# Patient Record
Sex: FEMALE | Race: AMERICAN INDIAN OR ALASKA NATIVE | Employment: PART TIME | URBAN - METROPOLITAN AREA
[De-identification: names, ages, dates, MRNs, and addresses within clinical notes are randomized per-mention and may not be internally consistent; named-entity substitution may affect disease eponyms.]

---

## 2017-01-05 ENCOUNTER — OFFICE VISIT (OUTPATIENT)
Dept: OTOLARYNGOLOGY | Facility: CLINIC | Age: 72
End: 2017-01-05

## 2017-01-05 VITALS
BODY MASS INDEX: 29.44 KG/M2 | HEIGHT: 62 IN | DIASTOLIC BLOOD PRESSURE: 78 MMHG | WEIGHT: 160 LBS | TEMPERATURE: 98 F | SYSTOLIC BLOOD PRESSURE: 126 MMHG

## 2017-01-05 DIAGNOSIS — C32.9 LARYNGEAL CARCINOMA (HCC): Primary | ICD-10-CM

## 2017-01-05 PROCEDURE — 99213 OFFICE O/P EST LOW 20 MIN: CPT | Performed by: OTOLARYNGOLOGY

## 2017-01-05 PROCEDURE — 31575 DIAGNOSTIC LARYNGOSCOPY: CPT | Performed by: OTOLARYNGOLOGY

## 2017-01-05 RX ORDER — ACETAMINOPHEN AND CODEINE PHOSPHATE 300; 30 MG/1; MG/1
TABLET ORAL
COMMUNITY
Start: 2016-11-13 | End: 2020-06-08 | Stop reason: ALTCHOICE

## 2017-01-06 NOTE — PROGRESS NOTES
James Flaherty is a 70year old female. Patient presents with: Follow - Up: regarding laryngeal carcinoma, pt is doing well     HPI:   He has occasional irritation in her throat but otherwise is dog very well.      Current Outpatient Prescriptions:  Atorv Inspection - Normal. Palpation - Normal. Parotid gland - Normal. Thyroid gland - Normal.   Psychiatric Normal Orientation - Oriented to time, place, person & situation. Appropriate mood and affect. Lymph Detail Normal Submental. Submandibular.  Anterior c

## 2017-05-15 ENCOUNTER — LAB ENCOUNTER (OUTPATIENT)
Dept: LAB | Age: 72
End: 2017-05-15
Attending: PHYSICAL MEDICINE & REHABILITATION
Payer: MEDICARE

## 2017-05-15 DIAGNOSIS — Z51.81 ENCOUNTER FOR THERAPEUTIC DRUG MONITORING: ICD-10-CM

## 2017-05-15 DIAGNOSIS — Z79.1 ENCOUNTER FOR LONG-TERM (CURRENT) USE OF NON-STEROIDAL ANTI-INFLAMMATORIES: Primary | ICD-10-CM

## 2017-05-15 PROCEDURE — 36415 COLL VENOUS BLD VENIPUNCTURE: CPT

## 2017-05-15 PROCEDURE — 85610 PROTHROMBIN TIME: CPT

## 2017-05-15 PROCEDURE — 85730 THROMBOPLASTIN TIME PARTIAL: CPT

## 2017-08-10 ENCOUNTER — OFFICE VISIT (OUTPATIENT)
Dept: OTOLARYNGOLOGY | Facility: CLINIC | Age: 72
End: 2017-08-10

## 2017-08-10 VITALS
WEIGHT: 150 LBS | SYSTOLIC BLOOD PRESSURE: 112 MMHG | DIASTOLIC BLOOD PRESSURE: 60 MMHG | TEMPERATURE: 98 F | BODY MASS INDEX: 27.6 KG/M2 | HEIGHT: 62 IN

## 2017-08-10 DIAGNOSIS — C32.9 LARYNGEAL CARCINOMA (HCC): Primary | ICD-10-CM

## 2017-08-10 PROCEDURE — 99213 OFFICE O/P EST LOW 20 MIN: CPT | Performed by: OTOLARYNGOLOGY

## 2017-08-10 PROCEDURE — 31575 DIAGNOSTIC LARYNGOSCOPY: CPT | Performed by: OTOLARYNGOLOGY

## 2017-08-10 RX ORDER — CHLORHEXIDINE GLUCONATE 0.12 MG/ML
RINSE ORAL
COMMUNITY
Start: 2017-07-12 | End: 2020-06-08 | Stop reason: ALTCHOICE

## 2017-08-10 RX ORDER — NORTRIPTYLINE HYDROCHLORIDE 10 MG/1
20 CAPSULE ORAL NIGHTLY
COMMUNITY
Start: 2017-08-07 | End: 2020-11-19 | Stop reason: DRUGHIGH

## 2017-08-11 NOTE — PROGRESS NOTES
Michael Lentz is a 70year old female. Patient presents with: Follow - Up: 7 month follow up- laryngeal carcinoma- per  pt she is doing okay    HPI:   She is doing well in follow up of her laryngeal carcinoma. Finished XRT 10/14. Feels pretty well.  No c cords and larynx    Nasal Normal External nose - Normal. Nasal septum - Normal, Turbinates - Normal   Neurological Normal Memory - Normal. Cranial nerves - Cranial nerves II through XII grossly intact.    Neck Exam Normal Inspection - Normal. Palpation - No understanding of these issues and agrees to the plan. Margarito Hunt MD  8/10/2017  10:18 PM

## 2017-12-04 ENCOUNTER — HOSPITAL ENCOUNTER (OUTPATIENT)
Dept: MAMMOGRAPHY | Age: 72
Discharge: HOME | End: 2017-12-04
Attending: INTERNAL MEDICINE
Payer: MEDICARE

## 2017-12-04 DIAGNOSIS — Z12.39 ENCOUNTER FOR SCREENING FOR MALIGNANT NEOPLASM OF BREAST: ICD-10-CM

## 2017-12-04 PROCEDURE — 77067 SCR MAMMO BI INCL CAD: CPT | Performed by: INTERNAL MEDICINE

## 2018-03-09 ENCOUNTER — HOSPITAL ENCOUNTER (OUTPATIENT)
Dept: CT IMAGING | Facility: HOSPITAL | Age: 73
Discharge: HOME | End: 2018-03-09
Attending: INTERNAL MEDICINE
Payer: MEDICARE

## 2018-03-09 DIAGNOSIS — F07.81 POSTCONCUSSION SYNDROME: ICD-10-CM

## 2018-10-02 ENCOUNTER — OFFICE VISIT (OUTPATIENT)
Dept: OTOLARYNGOLOGY | Facility: CLINIC | Age: 73
End: 2018-10-02
Payer: MEDICARE

## 2018-10-02 VITALS
DIASTOLIC BLOOD PRESSURE: 74 MMHG | HEIGHT: 62 IN | TEMPERATURE: 98 F | WEIGHT: 150 LBS | BODY MASS INDEX: 27.6 KG/M2 | SYSTOLIC BLOOD PRESSURE: 120 MMHG

## 2018-10-02 DIAGNOSIS — C32.9 LARYNGEAL CARCINOMA (HCC): Primary | ICD-10-CM

## 2018-10-02 PROCEDURE — 31575 DIAGNOSTIC LARYNGOSCOPY: CPT | Performed by: OTOLARYNGOLOGY

## 2018-10-02 PROCEDURE — 99213 OFFICE O/P EST LOW 20 MIN: CPT | Performed by: OTOLARYNGOLOGY

## 2018-10-02 NOTE — PROGRESS NOTES
Merrell Goodpasture is a 67year old female. Patient presents with:  Throat Problem: Laryngeal carcinoma: pt reports feeling well    HPI:   She has been feeling really well following her radiation therapy which she completed in 2014.   She feels that she has bu Normal. Skull - Normal.   Oral/Oropharynx Normal Lips - Normal, Tonsils - Normal, Tongue - Normal    fiberoptic laryngoscopy shows no lesions in the hypopharynx or larynx   Nasal Normal External nose - Normal. Nasal septum - Normal, Turbinates - Normal   N recurrence of her disease. She finished her therapy in 2014. I recommended another office visit in 6 months.   We can consider yearly surveillance after that  - Horace Betts      The patient indicates understanding of these issues an

## 2018-12-29 ENCOUNTER — OFFICE VISIT (OUTPATIENT)
Dept: OTOLARYNGOLOGY | Facility: CLINIC | Age: 73
End: 2018-12-29
Payer: MEDICARE

## 2018-12-29 VITALS
SYSTOLIC BLOOD PRESSURE: 110 MMHG | BODY MASS INDEX: 27.6 KG/M2 | DIASTOLIC BLOOD PRESSURE: 70 MMHG | HEIGHT: 62 IN | WEIGHT: 150 LBS | TEMPERATURE: 98 F

## 2018-12-29 DIAGNOSIS — H61.23 BILATERAL IMPACTED CERUMEN: Primary | ICD-10-CM

## 2018-12-29 PROCEDURE — 69210 REMOVE IMPACTED EAR WAX UNI: CPT | Performed by: OTOLARYNGOLOGY

## 2018-12-29 RX ORDER — AZITHROMYCIN 500 MG/1
TABLET, FILM COATED ORAL
COMMUNITY
Start: 2018-12-11 | End: 2020-06-08 | Stop reason: ALTCHOICE

## 2018-12-29 NOTE — PROGRESS NOTES
Pavan An is a 68year old female.  Patient presents with:  Ear Problem: Ear cleaning    HPI:   She feels that her ears have both been blocked and that her hearing is very diminished for the last 1-2 weeks    Current Outpatient Medications:  Nortripty - Normal   Neurological Normal Memory - Normal. Cranial nerves - Cranial nerves II through XII grossly intact.    Neck Exam Normal Inspection - Normal. Palpation - Normal. Parotid gland - Normal. Thyroid gland - Normal.   Psychiatric Normal Orientation - Or

## 2019-02-27 ENCOUNTER — HOSPITAL ENCOUNTER (OUTPATIENT)
Dept: MAMMOGRAPHY | Facility: HOSPITAL | Age: 74
Discharge: HOME | End: 2019-02-27
Attending: INTERNAL MEDICINE
Payer: MEDICARE

## 2019-02-27 DIAGNOSIS — Z12.39 BREAST SCREENING: ICD-10-CM

## 2019-02-27 PROCEDURE — 77063 BREAST TOMOSYNTHESIS BI: CPT | Performed by: INTERNAL MEDICINE

## 2019-02-27 PROCEDURE — 77067 SCR MAMMO BI INCL CAD: CPT | Performed by: INTERNAL MEDICINE

## 2019-03-19 ENCOUNTER — OFFICE VISIT (OUTPATIENT)
Dept: OTOLARYNGOLOGY | Facility: CLINIC | Age: 74
End: 2019-03-19
Payer: MEDICARE

## 2019-03-19 VITALS — BODY MASS INDEX: 27.6 KG/M2 | WEIGHT: 150 LBS | HEIGHT: 62 IN

## 2019-03-19 DIAGNOSIS — C32.9 LARYNGEAL CARCINOMA (HCC): ICD-10-CM

## 2019-03-19 DIAGNOSIS — H61.23 BILATERAL IMPACTED CERUMEN: Primary | ICD-10-CM

## 2019-03-19 PROCEDURE — 99213 OFFICE O/P EST LOW 20 MIN: CPT | Performed by: OTOLARYNGOLOGY

## 2019-03-19 PROCEDURE — 31575 DIAGNOSTIC LARYNGOSCOPY: CPT | Performed by: OTOLARYNGOLOGY

## 2019-03-19 RX ORDER — MOMETASONE FUROATE 1 MG/G
CREAM TOPICAL
Qty: 15 G | Refills: 0 | Status: SHIPPED | OUTPATIENT
Start: 2019-03-19 | End: 2020-06-08 | Stop reason: ALTCHOICE

## 2019-03-20 ENCOUNTER — TELEPHONE (OUTPATIENT)
Dept: OTOLARYNGOLOGY | Facility: CLINIC | Age: 74
End: 2019-03-20

## 2019-03-20 NOTE — PROGRESS NOTES
Michael Lentz is a 68year old female. Patient presents with:  Ear Problem: pt reports clogged ears, itchy   Throat Problem: Laryngeal carcinoma: pt here for a check up, no complaints    HPI:   Her ears are blocked with wax and she cannot hear well.   She Head/Face Normal Facial features - Normal. Eyebrows - Normal. Skull - Normal.   Oral/Oropharynx Normal Lips - Normal, Tonsils - Normal, Tongue - Normal    fiberoptic laryngoscopy shows no lesions in the larynx.    Nasal Normal External nose - Normal. Nasa comments:     ASSESSMENT AND PLAN:   1. Bilateral impacted cerumen  Cerumen cleared bilaterally. 2. Laryngeal carcinoma (HCC)  No evidence of recurrence of her disease.   I recommended as it has been 5 years that we continue surveillance but increased in

## 2019-03-20 NOTE — TELEPHONE ENCOUNTER
[facility] does not have the Mometasone Furate med. It is on back order. Pt. Wants to know if dr is able to order another med., or can she get Rx so she can go to another Pharm. ?

## 2019-03-23 NOTE — TELEPHONE ENCOUNTER
LMTCB- [facility] [place] carries medication  And rx called into pharmacy.  Please give pt number to pharmacy [phone]

## 2019-04-02 ENCOUNTER — OFFICE VISIT (OUTPATIENT)
Dept: OTOLARYNGOLOGY | Facility: CLINIC | Age: 74
End: 2019-04-02
Payer: MEDICARE

## 2019-04-02 VITALS
WEIGHT: 150 LBS | TEMPERATURE: 98 F | HEIGHT: 62 IN | BODY MASS INDEX: 27.6 KG/M2 | SYSTOLIC BLOOD PRESSURE: 122 MMHG | DIASTOLIC BLOOD PRESSURE: 78 MMHG

## 2019-04-02 DIAGNOSIS — H60.542 ACUTE ECZEMATOID OTITIS EXTERNA OF LEFT EAR: Primary | ICD-10-CM

## 2019-04-02 PROCEDURE — 92504 EAR MICROSCOPY EXAMINATION: CPT | Performed by: OTOLARYNGOLOGY

## 2019-04-02 PROCEDURE — G0463 HOSPITAL OUTPT CLINIC VISIT: HCPCS | Performed by: OTOLARYNGOLOGY

## 2019-04-02 PROCEDURE — 99213 OFFICE O/P EST LOW 20 MIN: CPT | Performed by: OTOLARYNGOLOGY

## 2019-04-02 NOTE — PROGRESS NOTES
Sol Guillaume is a 68year old female. Patient presents with:  Ear Problem: hearing loss in left ear for 1 month    HPI:   She feels that she is been having some difficulty with her hearing in her left ear for the last few weeks. There is been no pain. Inspection - Normal.   Constitutional Normal Overall appearance - Normal.   Head/Face Normal Facial features - Normal. Eyebrows - Normal. Skull - Normal.   Oral/Oropharynx Normal Lips - Normal, Tonsils - Normal, Tongue - Normal    Nasal Normal External nos

## 2019-05-29 ENCOUNTER — OFFICE VISIT (OUTPATIENT)
Dept: OTOLARYNGOLOGY | Facility: CLINIC | Age: 74
End: 2019-05-29
Payer: MEDICARE

## 2019-05-29 VITALS
HEIGHT: 66 IN | BODY MASS INDEX: 24.11 KG/M2 | WEIGHT: 150 LBS | TEMPERATURE: 101 F | DIASTOLIC BLOOD PRESSURE: 86 MMHG | HEART RATE: 106 BPM | SYSTOLIC BLOOD PRESSURE: 151 MMHG

## 2019-05-29 DIAGNOSIS — L03.211 FACIAL CELLULITIS: ICD-10-CM

## 2019-05-29 DIAGNOSIS — H60.63 CHRONIC OTITIS EXTERNA OF BOTH EARS, UNSPECIFIED TYPE: Primary | ICD-10-CM

## 2019-05-29 PROCEDURE — 69210 REMOVE IMPACTED EAR WAX UNI: CPT | Performed by: OTOLARYNGOLOGY

## 2019-05-29 PROCEDURE — G0463 HOSPITAL OUTPT CLINIC VISIT: HCPCS | Performed by: OTOLARYNGOLOGY

## 2019-05-29 PROCEDURE — 99213 OFFICE O/P EST LOW 20 MIN: CPT | Performed by: OTOLARYNGOLOGY

## 2019-05-29 RX ORDER — NEOMYCIN SULFATE, POLYMYXIN B SULFATE AND HYDROCORTISONE 10; 3.5; 1 MG/ML; MG/ML; [USP'U]/ML
3 SUSPENSION/ DROPS AURICULAR (OTIC) 2 TIMES DAILY
Qty: 1 BOTTLE | Refills: 2 | Status: SHIPPED | OUTPATIENT
Start: 2019-05-29 | End: 2019-06-08

## 2019-05-29 RX ORDER — SULFAMETHOXAZOLE AND TRIMETHOPRIM 800; 160 MG/1; MG/1
1 TABLET ORAL 2 TIMES DAILY
Qty: 28 TABLET | Refills: 0 | Status: SHIPPED | OUTPATIENT
Start: 2019-05-29 | End: 2019-06-12

## 2019-05-29 NOTE — PROGRESS NOTES
Jalil Pacheco is a 68year old female.  Patient presents with:  Ear Problem: pain with touch on left side of face, left ear feels clogged       HISTORY OF PRESENT ILLNESS  5/29/2019  Patient has been seeing Dr. Maribell Hunt for ear issues sounds like she had delgado service: Not on file        Active member of club or organization: Not on file        Attends meetings of clubs or organizations: Not on file        Relationship status: Not on file      Intimate partner violence:        Fear of current or ex partner: Not Sitting, Cuff Size: adult)   Pulse 106   Temp (!) 101 °F (38.3 °C) (Tympanic)   Ht 5' 6\" (1.676 m)   Wt 150 lb (68 kg)   BMI 24.21 kg/m²     System Findings Details   Skin Normal Inspection - Normal. No suspicious lesions bruises or masses.    Constitution Oral Tab, , Disp: , Rfl:   •  Naproxen Sodium 220 MG Oral Cap, Take 220 mg by mouth as needed. , Disp: , Rfl:   •  Atorvastatin Calcium 20 MG Oral Tab, , Disp: , Rfl:   •  Lisinopril-Hydrochlorothiazide 10-12.5 MG Oral Tab, , Disp: , Rfl:   •  MetFORMIN HCl

## 2019-06-25 ENCOUNTER — OFFICE VISIT (OUTPATIENT)
Dept: OTOLARYNGOLOGY | Facility: CLINIC | Age: 74
End: 2019-06-25
Payer: MEDICARE

## 2019-06-25 VITALS
DIASTOLIC BLOOD PRESSURE: 88 MMHG | WEIGHT: 150 LBS | SYSTOLIC BLOOD PRESSURE: 138 MMHG | TEMPERATURE: 98 F | HEIGHT: 66 IN | BODY MASS INDEX: 24.11 KG/M2

## 2019-06-25 DIAGNOSIS — H60.63 CHRONIC OTITIS EXTERNA OF BOTH EARS, UNSPECIFIED TYPE: Primary | ICD-10-CM

## 2019-06-25 PROCEDURE — 99213 OFFICE O/P EST LOW 20 MIN: CPT | Performed by: OTOLARYNGOLOGY

## 2019-06-25 PROCEDURE — G0463 HOSPITAL OUTPT CLINIC VISIT: HCPCS | Performed by: OTOLARYNGOLOGY

## 2019-06-25 NOTE — PROGRESS NOTES
Raynaldo Canavan is a 68year old female. Patient presents with:  Ear Problem: pt reports both ears feel itchy, stuffy ears     HPI:   Was seen about a month ago with severe right-sided external otitis.   That went away but she still feels some fullness and Overall appearance - Normal.   Head/Face Normal Facial features - Normal. Eyebrows - Normal. Skull - Normal.   Oral/Oropharynx Normal Lips - Normal, Tonsils - Normal, Tongue - Normal    Nasal Normal External nose - Normal. Nasal septum - Normal, Turbinates

## 2019-09-18 ENCOUNTER — OFFICE VISIT (OUTPATIENT)
Dept: OTOLARYNGOLOGY | Facility: CLINIC | Age: 74
End: 2019-09-18
Payer: MEDICARE

## 2019-09-18 VITALS
DIASTOLIC BLOOD PRESSURE: 84 MMHG | WEIGHT: 150 LBS | SYSTOLIC BLOOD PRESSURE: 142 MMHG | TEMPERATURE: 98 F | HEIGHT: 66 IN | BODY MASS INDEX: 24.11 KG/M2

## 2019-09-18 DIAGNOSIS — H60.393 OTHER INFECTIVE CHRONIC OTITIS EXTERNA OF BOTH EARS: Primary | ICD-10-CM

## 2019-09-18 PROCEDURE — 92504 EAR MICROSCOPY EXAMINATION: CPT | Performed by: OTOLARYNGOLOGY

## 2019-09-18 PROCEDURE — G0463 HOSPITAL OUTPT CLINIC VISIT: HCPCS | Performed by: OTOLARYNGOLOGY

## 2019-09-18 PROCEDURE — 99213 OFFICE O/P EST LOW 20 MIN: CPT | Performed by: OTOLARYNGOLOGY

## 2019-09-18 RX ORDER — NEOMYCIN SULFATE, POLYMYXIN B SULFATE AND HYDROCORTISONE 10; 3.5; 1 MG/ML; MG/ML; [USP'U]/ML
3 SUSPENSION/ DROPS AURICULAR (OTIC) 2 TIMES DAILY
Qty: 1 BOTTLE | Refills: 1 | Status: SHIPPED | OUTPATIENT
Start: 2019-09-18 | End: 2019-09-28

## 2019-09-18 NOTE — PROGRESS NOTES
Federico Hernandez is a 68year old female.  Patient presents with:  Hearing Loss: decreased hearing of both ears for a while      HISTORY OF PRESENT ILLNESS  5/29/2019  Patient has been seeing Dr. Gini Jean for ear issues sounds like she had some dermatitis she w file        Minutes per session: Not on file      Stress: Not on file    Relationships      Social connections:        Talks on phone: Not on file        Gets together: Not on file        Attends Christianity service: Not on file        Active member of club Integumentary Negative Frequent skin infections, pigment change and rash. Hema/Lymph Negative Easy bleeding and easy bruising.      PHYSICAL EXAM    /84   Temp 97.9 °F (36.6 °C) (Tympanic)   Ht 5' 6\" (1.676 m)   Wt 150 lb (68 kg)   BMI 24.21 kg/m •  MetFORMIN HCl (GLUCOPHAGE) 500 MG Oral Tab, Take 500 mg by mouth 2 (two) times daily with meals. , Disp: , Rfl:     ASSESSMENT AND PLAN  1. Chronic otitis externa of both ears, unspecified type  Chronic otitis externa.   This may be something that may

## 2020-02-24 ENCOUNTER — OFFICE VISIT (OUTPATIENT)
Dept: OTOLARYNGOLOGY | Facility: CLINIC | Age: 75
End: 2020-02-24
Payer: MEDICARE

## 2020-02-24 VITALS
WEIGHT: 140 LBS | HEIGHT: 62 IN | DIASTOLIC BLOOD PRESSURE: 77 MMHG | SYSTOLIC BLOOD PRESSURE: 126 MMHG | BODY MASS INDEX: 25.76 KG/M2 | TEMPERATURE: 98 F

## 2020-02-24 DIAGNOSIS — H61.23 BILATERAL IMPACTED CERUMEN: ICD-10-CM

## 2020-02-24 DIAGNOSIS — C32.9 LARYNGEAL CARCINOMA (HCC): Primary | ICD-10-CM

## 2020-02-24 PROCEDURE — 31575 DIAGNOSTIC LARYNGOSCOPY: CPT | Performed by: OTOLARYNGOLOGY

## 2020-02-24 PROCEDURE — G0463 HOSPITAL OUTPT CLINIC VISIT: HCPCS | Performed by: OTOLARYNGOLOGY

## 2020-02-24 PROCEDURE — 99213 OFFICE O/P EST LOW 20 MIN: CPT | Performed by: OTOLARYNGOLOGY

## 2020-02-24 NOTE — PROGRESS NOTES
Wilfrid Sampson is a 76year old female. Patient presents with:  Cerumen Impaction: bilateral ear cleaning   Tonsil Problem: follow up from throat cancer, no changes     HPI:   Her ears feel like they are blocked with wax again.   She is in follow-up of lar appearance - Normal.   Head/Face Normal Facial features - Normal. Eyebrows - Normal. Skull - Normal.   Oral/Oropharynx Normal Lips - Normal, Tonsils - Normal, Tongue - Normal   fiberoptic laryngoscopy shows no lesions in hypopharynx or larynx   Nasal Lucho Fee obstruction. General comments:     ASSESSMENT AND PLAN:   1. Laryngeal carcinoma ([address]  Doing well with no evidence of recurrence of disease. Return in 1 year  - [facility],DIAGNOSTIC    2.  Bilateral impacted cerumen  Cerumen and debris jose

## 2020-03-10 ENCOUNTER — HOSPITAL ENCOUNTER (OUTPATIENT)
Dept: MAMMOGRAPHY | Facility: HOSPITAL | Age: 75
Discharge: HOME | End: 2020-03-10
Attending: INTERNAL MEDICINE
Payer: MEDICARE

## 2020-03-10 DIAGNOSIS — Z12.31 ENCOUNTER FOR SCREENING MAMMOGRAM FOR MALIGNANT NEOPLASM OF BREAST: ICD-10-CM

## 2020-03-10 PROCEDURE — 77063 BREAST TOMOSYNTHESIS BI: CPT | Performed by: INTERNAL MEDICINE

## 2020-03-10 PROCEDURE — 77067 SCR MAMMO BI INCL CAD: CPT | Performed by: INTERNAL MEDICINE

## 2020-06-20 ENCOUNTER — LAB ENCOUNTER (OUTPATIENT)
Dept: LAB | Facility: HOSPITAL | Age: 75
End: 2020-06-20
Attending: INTERNAL MEDICINE
Payer: MEDICARE

## 2020-06-20 DIAGNOSIS — Z01.812 PRE-PROCEDURE LAB EXAM: Primary | ICD-10-CM

## 2020-06-21 NOTE — PROGRESS NOTES
6/21/2020  [address] [place] [address] [place] [address]    Dear Tati Larson,       Here are your results from your recent visit with Gastroenterology. Your test was negative for active infection by the Covid virus.     If you need any further

## 2020-06-22 ENCOUNTER — HOSPITAL ENCOUNTER (OUTPATIENT)
Facility: HOSPITAL | Age: 75
Setting detail: HOSPITAL OUTPATIENT SURGERY
Discharge: HOME | End: 2020-06-22
Attending: INTERNAL MEDICINE | Admitting: INTERNAL MEDICINE
Payer: MEDICARE

## 2020-06-22 VITALS
DIASTOLIC BLOOD PRESSURE: 86 MMHG | WEIGHT: 140 LBS | BODY MASS INDEX: 25.76 KG/M2 | OXYGEN SATURATION: 100 % | SYSTOLIC BLOOD PRESSURE: 172 MMHG | HEART RATE: 90 BPM | RESPIRATION RATE: 14 BRPM | HEIGHT: 62 IN

## 2020-06-22 DIAGNOSIS — Z86.010 PERSONAL HISTORY OF COLONIC POLYPS: ICD-10-CM

## 2020-06-22 PROCEDURE — 99152 MOD SED SAME PHYS/QHP 5/>YRS: CPT | Performed by: INTERNAL MEDICINE

## 2020-06-22 PROCEDURE — 99153 MOD SED SAME PHYS/QHP EA: CPT | Performed by: INTERNAL MEDICINE

## 2020-06-22 PROCEDURE — 88305 TISSUE EXAM BY PATHOLOGIST: CPT | Performed by: INTERNAL MEDICINE

## 2020-06-22 PROCEDURE — 82962 GLUCOSE BLOOD TEST: CPT

## 2020-06-22 PROCEDURE — 0DBH8ZX EXCISION OF CECUM, VIA NATURAL OR ARTIFICIAL OPENING ENDOSCOPIC, DIAGNOSTIC: ICD-10-PCS | Performed by: INTERNAL MEDICINE

## 2020-06-22 RX ORDER — SODIUM CHLORIDE, SODIUM LACTATE, POTASSIUM CHLORIDE, CALCIUM CHLORIDE 600; 310; 30; 20 MG/100ML; MG/100ML; MG/100ML; MG/100ML
INJECTION, SOLUTION INTRAVENOUS CONTINUOUS
Status: DISCONTINUED | OUTPATIENT
Start: 2020-06-22 | End: 2020-06-22

## 2020-06-22 RX ORDER — SODIUM CHLORIDE 0.9 % (FLUSH) 0.9 %
10 SYRINGE (ML) INJECTION AS NEEDED
Status: DISCONTINUED | OUTPATIENT
Start: 2020-06-22 | End: 2020-06-22

## 2020-06-22 RX ORDER — MIDAZOLAM HYDROCHLORIDE 1 MG/ML
INJECTION INTRAMUSCULAR; INTRAVENOUS
Status: DISCONTINUED | OUTPATIENT
Start: 2020-06-22 | End: 2020-06-22

## 2020-06-22 RX ORDER — MIDAZOLAM HYDROCHLORIDE 1 MG/ML
1 INJECTION INTRAMUSCULAR; INTRAVENOUS EVERY 5 MIN PRN
Status: DISCONTINUED | OUTPATIENT
Start: 2020-06-22 | End: 2020-06-22

## 2020-06-22 NOTE — PROGRESS NOTES
Transcription: Please add the endoscopic report as well as the attached pathology report to this letter to her primary care physician.  Thanks    6/22/2020    Re:  Francy Pramod   [birth year]   [id number]       Dear Victoria Fine,    I had the pleasure of seeing Travis Baldwin

## 2020-06-22 NOTE — H&P
RE-PROCEDURE UPDATE    HPI: Sol Guillaume is a 76year old female. 12/15/1945. Patient presents for a colonoscopy.     ALLERGIES:   Penicillins             NAUSEA AND VOMITING  Radiology Contrast *    UNKNOWN  Neosporin Af [Micon*    RASH    No current

## 2020-06-22 NOTE — OPERATIVE REPORT
COLONOSCOPY REPORT    Patient Name:  Kirill Miller Record #: [id number]  YOB: 1945  Date of Procedure: 6/22/2020    Referring physician: Amanda Parikh MD    Surgeon:  Ugo May.  Kaylynn Berger MD    Pre-op diagnosis: Colon cancer screenin

## 2020-06-22 NOTE — PROGRESS NOTES
6/22/2020  [address] [place] [address] [address] [place] [id number]    Dear Ruben Hunter,     Here are the results from your recent testing :    During your colonoscopy a polyp was removed. The pathology showed that this polyp was a hyperplastic polyp.   This

## 2020-07-02 ENCOUNTER — OFFICE VISIT (OUTPATIENT)
Dept: OTOLARYNGOLOGY | Facility: CLINIC | Age: 75
End: 2020-07-02
Payer: MEDICARE

## 2020-07-02 VITALS
WEIGHT: 140 LBS | DIASTOLIC BLOOD PRESSURE: 74 MMHG | BODY MASS INDEX: 25.76 KG/M2 | SYSTOLIC BLOOD PRESSURE: 134 MMHG | TEMPERATURE: 98 F | HEIGHT: 62 IN

## 2020-07-02 DIAGNOSIS — C32.9 LARYNGEAL CARCINOMA (HCC): ICD-10-CM

## 2020-07-02 DIAGNOSIS — H61.23 BILATERAL IMPACTED CERUMEN: Primary | ICD-10-CM

## 2020-07-02 PROCEDURE — 92504 EAR MICROSCOPY EXAMINATION: CPT | Performed by: OTOLARYNGOLOGY

## 2020-07-02 PROCEDURE — G0463 HOSPITAL OUTPT CLINIC VISIT: HCPCS | Performed by: OTOLARYNGOLOGY

## 2020-07-02 PROCEDURE — 99213 OFFICE O/P EST LOW 20 MIN: CPT | Performed by: OTOLARYNGOLOGY

## 2020-07-02 NOTE — PROGRESS NOTES
Jalil Pacheco is a 76year old female. Patient presents with:  Ear Wax: both ears    HPI:   Her ears feel like they are blocked once again. She is not having any pain or drainage. She does feel a fullness.   She has a history of laryngeal cancer status appearance - Normal.   Head/Face Normal Facial features - Normal. Eyebrows - Normal. Skull - Normal.   Oral/Oropharynx Normal Lips - Normal, Tonsils - Normal, Tongue - Normal    Nasal Normal External nose - Normal. Nasal septum - Normal, Turbinates - All Eleonora

## 2020-11-04 ENCOUNTER — TELEPHONE (OUTPATIENT)
Dept: NEUROLOGY | Facility: CLINIC | Age: 75
End: 2020-11-04

## 2020-11-04 ENCOUNTER — OFFICE VISIT (OUTPATIENT)
Dept: NEUROLOGY | Facility: CLINIC | Age: 75
End: 2020-11-04
Payer: MEDICARE

## 2020-11-04 VITALS — HEIGHT: 62 IN | BODY MASS INDEX: 26.87 KG/M2 | WEIGHT: 146 LBS

## 2020-11-04 DIAGNOSIS — E11.9 TYPE 2 DIABETES MELLITUS WITHOUT COMPLICATION, WITHOUT LONG-TERM CURRENT USE OF INSULIN (HCC): ICD-10-CM

## 2020-11-04 DIAGNOSIS — M48.062 SPINAL STENOSIS OF LUMBAR REGION WITH NEUROGENIC CLAUDICATION: Primary | ICD-10-CM

## 2020-11-04 PROCEDURE — 99215 OFFICE O/P EST HI 40 MIN: CPT | Performed by: PHYSICAL MEDICINE & REHABILITATION

## 2020-11-04 RX ORDER — HYDROCODONE BITARTRATE AND ACETAMINOPHEN 5; 325 MG/1; MG/1
1 TABLET ORAL EVERY 6 HOURS PRN
Qty: 60 TABLET | Refills: 0 | Status: SHIPPED | OUTPATIENT
Start: 2020-11-04

## 2020-11-04 RX ORDER — METHYLPREDNISOLONE 4 MG/1
TABLET ORAL
Qty: 1 PACKAGE | Refills: 0 | Status: SHIPPED | OUTPATIENT
Start: 2020-11-04 | End: 2020-11-20 | Stop reason: ALTCHOICE

## 2020-11-04 NOTE — PROGRESS NOTES
[address]  Progress Note    CHIEF COMPLAINT:  Patient presents with:  Hip Pain: Former patient of Dr. Lyle Hooper presents for ache in right hip.  Patient states that Dr. Lyle Hooper has treated her for this problem in REMOVAL OF TONSILS,12+ Y/O         SOCIAL HISTORY:   Social History    Occupational History      Occupation: NAIL TECH        Employer: [employer] EMPLOYED    Tobacco Use      Smoking status: Former Smoker        Packs/day: 0.50        Years: 40.00        Pack y denies   Musculoskeletal  Joint Stiffness: denies  Painful Joints: admits  Swollen Joints: denies   Peripheral Vascular  Swelling of Legs/Feet: denies  Cold Extremities: denies   Skin  Open Sores: denies  Nodules or Lumps: denies  Rash: denies   Neurologic over the next week and adjust diabetic meds appropriately. The patient was asked to contact her primary care physician for potential changes to their diabetic medication regimen. Patient verbalized understanding.     RTC:    Return in about 2 weeks (judith

## 2020-11-04 NOTE — TELEPHONE ENCOUNTER
Medicare Online for insurance coverage of MRI L-spine wo cpt code 74338. Insurance was verified and procedure is a covered benefit. Authorization is not required. Will call Pt. to inform. Pt. informed authorization is not required.  She will call to schedule

## 2020-11-19 PROBLEM — M48.062 SPINAL STENOSIS OF LUMBAR REGION WITH NEUROGENIC CLAUDICATION: Status: ACTIVE | Noted: 2020-11-19

## 2020-11-19 RX ORDER — NORTRIPTYLINE HYDROCHLORIDE 50 MG/1
CAPSULE ORAL
COMMUNITY
Start: 2020-10-30

## 2020-11-20 ENCOUNTER — OFFICE VISIT (OUTPATIENT)
Dept: OTOLARYNGOLOGY | Facility: CLINIC | Age: 75
End: 2020-11-20
Payer: MEDICARE

## 2020-11-20 ENCOUNTER — OFFICE VISIT (OUTPATIENT)
Dept: NEUROLOGY | Facility: CLINIC | Age: 75
End: 2020-11-20
Payer: MEDICARE

## 2020-11-20 VITALS
RESPIRATION RATE: 18 BRPM | DIASTOLIC BLOOD PRESSURE: 76 MMHG | SYSTOLIC BLOOD PRESSURE: 132 MMHG | HEART RATE: 86 BPM | TEMPERATURE: 98 F | HEIGHT: 62 IN | BODY MASS INDEX: 26.13 KG/M2 | WEIGHT: 142 LBS

## 2020-11-20 VITALS — BODY MASS INDEX: 26.13 KG/M2 | HEIGHT: 62 IN | WEIGHT: 142 LBS

## 2020-11-20 DIAGNOSIS — E11.9 TYPE 2 DIABETES MELLITUS WITHOUT COMPLICATION, WITHOUT LONG-TERM CURRENT USE OF INSULIN (HCC): ICD-10-CM

## 2020-11-20 DIAGNOSIS — C32.9 LARYNGEAL CARCINOMA (HCC): Primary | ICD-10-CM

## 2020-11-20 DIAGNOSIS — R07.0 THROAT PAIN: ICD-10-CM

## 2020-11-20 DIAGNOSIS — M48.062 SPINAL STENOSIS OF LUMBAR REGION WITH NEUROGENIC CLAUDICATION: Primary | ICD-10-CM

## 2020-11-20 PROCEDURE — G0463 HOSPITAL OUTPT CLINIC VISIT: HCPCS | Performed by: OTOLARYNGOLOGY

## 2020-11-20 PROCEDURE — 99213 OFFICE O/P EST LOW 20 MIN: CPT | Performed by: PHYSICAL MEDICINE & REHABILITATION

## 2020-11-20 PROCEDURE — 99213 OFFICE O/P EST LOW 20 MIN: CPT | Performed by: OTOLARYNGOLOGY

## 2020-11-20 PROCEDURE — 31575 DIAGNOSTIC LARYNGOSCOPY: CPT | Performed by: OTOLARYNGOLOGY

## 2020-11-20 NOTE — PROGRESS NOTES
[address]  Progress Note    CHIEF COMPLAINT:  Patient presents with:  Low Back Pain: Patients to follow up on low back. C/O having terrible pain in low back and down the side of the right leg.        History Employer: SELF EMPLOYED    Tobacco Use      Smoking status: Former Smoker        Packs/day: 0.50        Years: 40.00        Pack years: 21        Quit date: 2014        Years since quittin.3      Smokeless tobacco: Never Used    Substance and Sexu kg/m². General: No immediate distress  Eyes: Extra-occular movements intact. Heart: peripheral pulses intact. Normal capillary refill.    Extremities: No lower extremity edema bilaterally   Spine: Limited lumbar extension  Hips: full and painfree ROM

## 2020-11-20 NOTE — PROGRESS NOTES
Pavan An is a 76year old female. Patient presents with:  Throat Problem: c/o throat pain felt mostly on left side. denies any swallowing issues and voice changes.     HPI:   She is a history of laryngeal cancer treated with radiation about 6 years a headaches    EXAM:   /76   Pulse 86   Temp 98.2 °F (36.8 °C) (Tympanic)   Resp 18   Ht 5' 2\" (1.575 m)   Wt 142 lb (64.4 kg)   BMI 25.97 kg/m²   System Findings Details   Skin Normal Inspection - Normal.   Constitutional Normal Overall appearance - normal.  Vocal folds-false  Bilateral: Vocal folds (false) are normal.  Vocal folds-true  Bilateral: Vocal folds (true) are normal.  Pyriform sinus:  Bilateral: Pyriform sinuses are normal.  Base of tongue is normal in appearance.   There is no airway obstr

## 2020-11-24 ENCOUNTER — HOSPITAL ENCOUNTER (OUTPATIENT)
Dept: MRI IMAGING | Facility: HOSPITAL | Age: 75
Discharge: HOME | End: 2020-11-24
Attending: PHYSICAL MEDICINE & REHABILITATION
Payer: MEDICARE

## 2020-11-24 DIAGNOSIS — M48.062 SPINAL STENOSIS OF LUMBAR REGION WITH NEUROGENIC CLAUDICATION: ICD-10-CM

## 2020-11-24 PROCEDURE — 72148 MRI LUMBAR SPINE W/O DYE: CPT | Performed by: PHYSICAL MEDICINE & REHABILITATION

## 2020-11-27 ENCOUNTER — TELEPHONE (OUTPATIENT)
Dept: NEUROLOGY | Facility: CLINIC | Age: 75
End: 2020-11-27

## 2020-11-27 DIAGNOSIS — M48.062 SPINAL STENOSIS OF LUMBAR REGION WITH NEUROGENIC CLAUDICATION: Primary | ICD-10-CM

## 2020-11-27 NOTE — TELEPHONE ENCOUNTER
I personally reviewed a lumbar MRI from November 2020 showing severe L3-4 central canal stenosis, moderate L4-5 central canal stenosis, mild disc degeneration and facet arthropathy throughout.     Please of the patient know that her lumbar MRI shows spinal

## 2020-11-30 ENCOUNTER — TELEPHONE (OUTPATIENT)
Dept: NEUROLOGY | Facility: CLINIC | Age: 75
End: 2020-11-30

## 2020-11-30 NOTE — TELEPHONE ENCOUNTER
LMTCB about MRI results.          Note from DR Taryn Kim     I personally reviewed a lumbar MRI from November 2020 showing severe L3-4 central canal stenosis, moderate L4-5 central canal stenosis, mild disc degeneration and facet arthropathy throughout.     Ple

## 2020-12-01 NOTE — TELEPHONE ENCOUNTER
S/W patient about MRI results. Patient verbalized she understood the results and would like to proceed with injections. Patient has been scheduled for a Right L4 & Right L5 TFESI  on 12/03/2020at the [facility]. Medications and allergies reviewed.  Gilmer

## 2020-12-01 NOTE — TELEPHONE ENCOUNTER
S/W patient and she states she has had radiology with contrast dye in the past and did not have an allergic reaction. Offered allergy prep and patient refused.

## 2020-12-03 ENCOUNTER — OFFICE VISIT (OUTPATIENT)
Dept: SURGERY | Facility: CLINIC | Age: 75
End: 2020-12-03

## 2020-12-03 DIAGNOSIS — M48.062 SPINAL STENOSIS OF LUMBAR REGION WITH NEUROGENIC CLAUDICATION: Primary | ICD-10-CM

## 2020-12-03 PROCEDURE — 64483 NJX AA&/STRD TFRM EPI L/S 1: CPT | Performed by: PHYSICAL MEDICINE & REHABILITATION

## 2020-12-03 PROCEDURE — 64484 NJX AA&/STRD TFRM EPI L/S EA: CPT | Performed by: PHYSICAL MEDICINE & REHABILITATION

## 2020-12-03 NOTE — PROCEDURES
Preoperative Diagnosis:  (W91.552) Spinal stenosis of lumbar region with neurogenic claudication  (primary encounter diagnosis)       Postoperative Diagnosis:  (I93.025) Spinal stenosis of lumbar region with neurogenic claudication  (primary encounter diag

## 2020-12-21 ENCOUNTER — OFFICE VISIT (OUTPATIENT)
Dept: NEUROLOGY | Facility: CLINIC | Age: 75
End: 2020-12-21
Payer: MEDICARE

## 2020-12-21 ENCOUNTER — TELEPHONE (OUTPATIENT)
Dept: NEUROLOGY | Facility: CLINIC | Age: 75
End: 2020-12-21

## 2020-12-21 VITALS — WEIGHT: 142 LBS | BODY MASS INDEX: 26.13 KG/M2 | HEIGHT: 62 IN

## 2020-12-21 DIAGNOSIS — E11.9 TYPE 2 DIABETES MELLITUS WITHOUT COMPLICATION, WITHOUT LONG-TERM CURRENT USE OF INSULIN (HCC): ICD-10-CM

## 2020-12-21 DIAGNOSIS — M48.062 SPINAL STENOSIS OF LUMBAR REGION WITH NEUROGENIC CLAUDICATION: Primary | ICD-10-CM

## 2020-12-21 PROCEDURE — 99214 OFFICE O/P EST MOD 30 MIN: CPT | Performed by: PHYSICAL MEDICINE & REHABILITATION

## 2020-12-21 RX ORDER — IBUPROFEN 600 MG/1
600 TABLET ORAL EVERY 6 HOURS PRN
COMMUNITY

## 2020-12-21 NOTE — TELEPHONE ENCOUNTER
Patient has been scheduled for a Right L4 & Right L5 transforaminal epidural steroid injections on 12/31/20 at the [facility]. Medications and allergies reviewed.  Patient informed we will need verbal or written authorization from patients Primary Care Physician/

## 2020-12-21 NOTE — TELEPHONE ENCOUNTER
Medicare Online for insurance coverage of  Right L4 & Right L5 TFESI cpt codes [id number], [id number], [id number]. Insurance was verified and procedure is a covered benefit. Authorization is not required.   Will inform Nursing

## 2020-12-21 NOTE — PROGRESS NOTES
[address]  Progress Note    CHIEF COMPLAINT:  Patient presents with:  Low Back Pain: Patient presents for post injection follow up. She has received 50-60% relief from injection.        History of Present Il Smokeless tobacco: Never Used    Substance and Sexual Activity      Alcohol use: Yes        Frequency: Monthly or less        Drinks per session: 1 or 2        Binge frequency: Never        Comment: seldom      Drug use: No      Sexual activity: Not on cayla capillary refill.    Extremities: No lower extremity edema bilaterally   Spine: full and painfree lumbar ROM in all directions  Hips: full and painfree ROM   Neuro:   Cognition: alert & oriented x 3, attentive, able to follow 2 step commands, comprehention

## 2020-12-31 ENCOUNTER — OFFICE VISIT (OUTPATIENT)
Dept: SURGERY | Facility: CLINIC | Age: 75
End: 2020-12-31

## 2020-12-31 DIAGNOSIS — M48.062 SPINAL STENOSIS OF LUMBAR REGION WITH NEUROGENIC CLAUDICATION: Primary | ICD-10-CM

## 2020-12-31 PROCEDURE — 64483 NJX AA&/STRD TFRM EPI L/S 1: CPT | Performed by: PHYSICAL MEDICINE & REHABILITATION

## 2020-12-31 PROCEDURE — 64484 NJX AA&/STRD TFRM EPI L/S EA: CPT | Performed by: PHYSICAL MEDICINE & REHABILITATION

## 2021-01-04 NOTE — PROCEDURES
Preoperative Diagnosis:  (C70.498) Spinal stenosis of lumbar region with neurogenic claudication  (primary encounter diagnosis)       Postoperative Diagnosis:  (K01.725) Spinal stenosis of lumbar region with neurogenic claudication  (primary encounter diag

## 2021-01-11 ENCOUNTER — MED REC SCAN ONLY (OUTPATIENT)
Dept: NEUROLOGY | Facility: CLINIC | Age: 76
End: 2021-01-11

## 2021-01-11 ENCOUNTER — OFFICE VISIT (OUTPATIENT)
Dept: NEUROLOGY | Facility: CLINIC | Age: 76
End: 2021-01-11
Payer: MEDICARE

## 2021-01-11 VITALS — WEIGHT: 142 LBS | BODY MASS INDEX: 26.13 KG/M2 | HEIGHT: 62 IN

## 2021-01-11 DIAGNOSIS — E11.9 TYPE 2 DIABETES MELLITUS WITHOUT COMPLICATION, WITHOUT LONG-TERM CURRENT USE OF INSULIN (HCC): ICD-10-CM

## 2021-01-11 DIAGNOSIS — M48.062 SPINAL STENOSIS OF LUMBAR REGION WITH NEUROGENIC CLAUDICATION: Primary | ICD-10-CM

## 2021-01-11 PROCEDURE — 99213 OFFICE O/P EST LOW 20 MIN: CPT | Performed by: PHYSICAL MEDICINE & REHABILITATION

## 2021-01-11 NOTE — PROGRESS NOTES
[address]  Progress Note    CHIEF COMPLAINT:  Patient presents with:  Low Back Pain: Patient presents for pist right L4 and L5 transforaminal epidural injection follow up.  Patient states that she is 100% be by mouth every 6 (six) hours as needed for Pain. • Nortriptyline HCl 50 MG Oral Cap TAKE 1 CAPSULE BY MOUTH AT BEDTIME FOR 90 DAYS     • HYDROcodone-acetaminophen 5-325 MG Oral Tab Take 1 tablet by mouth every 6 (six) hours as needed for Pain.  60 table Spinal stenosis of lumbar region with neurogenic claudication  She is doing very well. I recommended she look up and perform Rishabh flexion exercises daily. She also will continue with nortriptyline indefinitely as a preventative neuropathic medicine.

## 2021-03-05 ENCOUNTER — TELEPHONE (OUTPATIENT)
Dept: NEUROLOGY | Facility: CLINIC | Age: 76
End: 2021-03-05

## 2021-03-05 DIAGNOSIS — Z23 NEED FOR VACCINATION: ICD-10-CM

## 2021-03-05 NOTE — TELEPHONE ENCOUNTER
Patient states she has not worked since her injections in December b/c she wanted to wait longer to heal after her epidural injections. Then she had a planned vacation for 10 days and had to quarantine for two weeks so this is why it has been so long.  Candy

## 2021-05-10 ENCOUNTER — HOSPITAL ENCOUNTER (OUTPATIENT)
Dept: MAMMOGRAPHY | Age: 76
Discharge: HOME | End: 2021-05-10
Attending: INTERNAL MEDICINE
Payer: MEDICARE

## 2021-05-10 DIAGNOSIS — Z12.31 ENCOUNTER FOR SCREENING MAMMOGRAM FOR MALIGNANT NEOPLASM OF BREAST: ICD-10-CM

## 2021-05-10 PROCEDURE — 77067 SCR MAMMO BI INCL CAD: CPT | Performed by: INTERNAL MEDICINE

## 2021-05-10 PROCEDURE — 77063 BREAST TOMOSYNTHESIS BI: CPT | Performed by: INTERNAL MEDICINE

## 2021-05-24 ENCOUNTER — TELEPHONE (OUTPATIENT)
Dept: NEUROLOGY | Facility: CLINIC | Age: 76
End: 2021-05-24

## 2021-05-24 NOTE — TELEPHONE ENCOUNTER
Patient calling since she has planned a long trip to [facility] over the summer and is afraid with the walking her back will go out. Patient asking if there is any way Dr. Dionne Key can determine if there is a spot in her back that may need an epidural inj.  Patient

## 2021-06-07 ENCOUNTER — OFFICE VISIT (OUTPATIENT)
Dept: NEUROLOGY | Facility: CLINIC | Age: 76
End: 2021-06-07
Payer: MEDICARE

## 2021-06-07 VITALS
DIASTOLIC BLOOD PRESSURE: 60 MMHG | SYSTOLIC BLOOD PRESSURE: 110 MMHG | HEIGHT: 62 IN | BODY MASS INDEX: 25.76 KG/M2 | WEIGHT: 140 LBS

## 2021-06-07 DIAGNOSIS — E11.9 TYPE 2 DIABETES MELLITUS WITHOUT COMPLICATION, WITHOUT LONG-TERM CURRENT USE OF INSULIN (HCC): ICD-10-CM

## 2021-06-07 DIAGNOSIS — M48.062 SPINAL STENOSIS OF LUMBAR REGION WITH NEUROGENIC CLAUDICATION: Primary | ICD-10-CM

## 2021-06-07 PROCEDURE — 99213 OFFICE O/P EST LOW 20 MIN: CPT | Performed by: PHYSICAL MEDICINE & REHABILITATION

## 2021-06-07 RX ORDER — LISINOPRIL AND HYDROCHLOROTHIAZIDE 20; 12.5 MG/1; MG/1
1 TABLET ORAL DAILY
COMMUNITY
Start: 2021-03-29

## 2021-06-07 RX ORDER — CLOTRIMAZOLE AND BETAMETHASONE DIPROPIONATE 10; .64 MG/G; MG/G
CREAM TOPICAL
COMMUNITY
Start: 2021-04-05

## 2021-06-07 RX ORDER — PREDNISONE 10 MG/1
TABLET ORAL
Qty: 28 TABLET | Refills: 0 | Status: SHIPPED | OUTPATIENT
Start: 2021-06-07

## 2021-06-07 RX ORDER — GLIPIZIDE AND METFORMIN HCL 2.5; 5 MG/1; MG/1
1 TABLET, FILM COATED ORAL 2 TIMES DAILY
COMMUNITY
Start: 2021-04-07

## 2021-06-07 RX ORDER — GLIPIZIDE 2.5 MG/1
2.5 TABLET, EXTENDED RELEASE ORAL 2 TIMES DAILY
COMMUNITY
Start: 2021-04-09 | End: 2021-06-07

## 2021-06-07 NOTE — PROGRESS NOTES
[address]  Progress Note    CHIEF COMPLAINT:  Patient presents with:  Hip Pain: c/o left hip pain  x3wks due to bowling. pain level 1/10 with no activity. takes aleve for pain.        History of Present Illn glipiZIDE-metFORMIN HCl 2.5-500 MG Oral Tab Take 1 tablet by mouth 2 (two) times daily. • Lisinopril-hydroCHLOROthiazide 20-12.5 MG Oral Tab Take 1 tablet by mouth daily.      • predniSONE 10 MG Oral Tab Take 7 tablets today,take 6 tablets tomorrow, the complication, without long-term current use of insulin ([facility] [address]  She will watch her blood sugars if she takes the prednisone.     Orders Placed This Encounter      clotrimazole-betamethasone 1-0.05 % External Cream      glipiZIDE-metFORMIN HCl 2.5-500 MG Oral

## 2022-03-04 ENCOUNTER — HOSPITAL ENCOUNTER (OUTPATIENT)
Dept: BONE DENSITY | Facility: HOSPITAL | Age: 77
Discharge: HOME | End: 2022-03-04
Attending: INTERNAL MEDICINE
Payer: MEDICARE

## 2022-03-04 ENCOUNTER — HOSPITAL ENCOUNTER (OUTPATIENT)
Dept: CV DIAGNOSTICS | Facility: HOSPITAL | Age: 77
Discharge: HOME | End: 2022-03-04
Attending: INTERNAL MEDICINE
Payer: MEDICARE

## 2022-03-04 DIAGNOSIS — Z78.0 ASYMPTOMATIC AGE-RELATED POSTMENOPAUSAL STATE: ICD-10-CM

## 2022-03-04 DIAGNOSIS — R01.1 HOLOSYSTOLIC MURMUR: ICD-10-CM

## 2022-03-04 PROCEDURE — 93306 TTE W/DOPPLER COMPLETE: CPT | Performed by: INTERNAL MEDICINE

## 2022-03-04 PROCEDURE — 77080 DXA BONE DENSITY AXIAL: CPT | Performed by: INTERNAL MEDICINE

## 2022-06-22 ENCOUNTER — HOSPITAL ENCOUNTER (OUTPATIENT)
Dept: MAMMOGRAPHY | Age: 77
Discharge: HOME | End: 2022-06-22
Attending: INTERNAL MEDICINE
Payer: MEDICARE

## 2022-06-22 DIAGNOSIS — Z12.31 ENCOUNTER FOR SCREENING MAMMOGRAM FOR MALIGNANT NEOPLASM OF BREAST: ICD-10-CM

## 2022-06-22 PROCEDURE — 77067 SCR MAMMO BI INCL CAD: CPT | Performed by: INTERNAL MEDICINE

## 2022-06-22 PROCEDURE — 77063 BREAST TOMOSYNTHESIS BI: CPT | Performed by: INTERNAL MEDICINE

## 2022-08-15 ENCOUNTER — LAB ENCOUNTER (OUTPATIENT)
Dept: LAB | Age: 77
End: 2022-08-15
Attending: INTERNAL MEDICINE
Payer: MEDICARE

## 2022-08-15 ENCOUNTER — HOSPITAL ENCOUNTER (OUTPATIENT)
Dept: GENERAL RADIOLOGY | Age: 77
Discharge: HOME | End: 2022-08-15
Attending: INTERNAL MEDICINE
Payer: MEDICARE

## 2022-08-15 DIAGNOSIS — R09.89 ABNORMAL CHEST SOUNDS: ICD-10-CM

## 2022-08-15 DIAGNOSIS — R61 DIAPHORESIS: Primary | ICD-10-CM

## 2022-08-15 LAB — TSI SER-ACNC: 2.23 MIU/ML (ref 0.36–3.74)

## 2022-08-15 PROCEDURE — 71046 X-RAY EXAM CHEST 2 VIEWS: CPT | Performed by: INTERNAL MEDICINE

## 2022-08-15 PROCEDURE — 36415 COLL VENOUS BLD VENIPUNCTURE: CPT

## 2022-08-15 PROCEDURE — 84443 ASSAY THYROID STIM HORMONE: CPT

## 2023-09-19 ENCOUNTER — HOSPITAL ENCOUNTER (OUTPATIENT)
Dept: MAMMOGRAPHY | Facility: HOSPITAL | Age: 78
Discharge: HOME | End: 2023-09-19
Attending: INTERNAL MEDICINE
Payer: MEDICARE

## 2023-09-19 DIAGNOSIS — Z12.31 VISIT FOR SCREENING MAMMOGRAM: ICD-10-CM

## 2023-09-19 PROCEDURE — 77063 BREAST TOMOSYNTHESIS BI: CPT | Performed by: INTERNAL MEDICINE

## 2023-09-19 PROCEDURE — 77067 SCR MAMMO BI INCL CAD: CPT | Performed by: INTERNAL MEDICINE

## 2024-06-20 ENCOUNTER — HOSPITAL ENCOUNTER (OUTPATIENT)
Dept: GENERAL RADIOLOGY | Age: 79
Discharge: HOME | End: 2024-06-20
Attending: INTERNAL MEDICINE

## 2024-06-20 DIAGNOSIS — M25.551 PAIN, JOINT, HIP, RIGHT: ICD-10-CM

## 2024-06-20 PROCEDURE — 73502 X-RAY EXAM HIP UNI 2-3 VIEWS: CPT | Performed by: INTERNAL MEDICINE

## 2025-01-23 NOTE — TELEPHONE ENCOUNTER
Medicare Online for insurance coverage of Right L4 & Right L5 TFESI cpt codes [id number], [id number], [id number]. Insurance was verified and procedure is a covered benefit. Authorization is not required. Will inform Nursing. Requesting refill on RX Pravastatin-[facility] in [place]

## 2025-02-04 ENCOUNTER — HOSPITAL ENCOUNTER (OUTPATIENT)
Dept: MAMMOGRAPHY | Age: 80
Discharge: HOME | End: 2025-02-04
Attending: INTERNAL MEDICINE
Payer: MEDICARE

## 2025-02-04 DIAGNOSIS — Z12.31 ENCOUNTER FOR SCREENING MAMMOGRAM FOR MALIGNANT NEOPLASM OF BREAST: ICD-10-CM

## 2025-02-04 PROCEDURE — 77067 SCR MAMMO BI INCL CAD: CPT | Performed by: INTERNAL MEDICINE

## 2025-02-04 PROCEDURE — 77063 BREAST TOMOSYNTHESIS BI: CPT | Performed by: INTERNAL MEDICINE

## 2025-03-10 ENCOUNTER — LAB ENCOUNTER (OUTPATIENT)
Dept: LAB | Age: 80
End: 2025-03-10
Attending: INTERNAL MEDICINE
Payer: MEDICARE

## 2025-03-10 DIAGNOSIS — I10 HTN (HYPERTENSION): Primary | ICD-10-CM

## 2025-03-10 DIAGNOSIS — E78.00 PURE HYPERCHOLESTEROLEMIA: ICD-10-CM

## 2025-03-10 LAB
ALBUMIN SERPL-MCNC: 4.7 G/DL (ref 3.2–4.8)
ALBUMIN/GLOB SERPL: 1.6 {RATIO} (ref 1–2)
ALP LIVER SERPL-CCNC: 49 U/L
ALT SERPL-CCNC: 35 U/L
ANION GAP SERPL CALC-SCNC: 13 MMOL/L (ref 0–18)
BASOPHILS # BLD AUTO: 0.04 X10(3) UL (ref 0–0.2)
BASOPHILS NFR BLD AUTO: 0.5 %
BILIRUB SERPL-MCNC: 0.4 MG/DL (ref 0.2–1.1)
BILIRUB UR QL: NEGATIVE
CALCIUM BLD-MCNC: 9.3 MG/DL (ref 8.7–10.4)
CHLORIDE SERPL-SCNC: 102 MMOL/L (ref 98–112)
CHOLEST SERPL-MCNC: 119 MG/DL (ref ?–200)
CLARITY UR: CLEAR
CO2 SERPL-SCNC: 20 MMOL/L (ref 21–32)
CREAT BLD-MCNC: 0.93 MG/DL
DEPRECATED RDW RBC AUTO: 45.4 FL (ref 35.1–46.3)
EGFRCR SERPLBLD CKD-EPI 2021: 63 ML/MIN/1.73M2 (ref 60–?)
EOSINOPHIL # BLD AUTO: 0.16 X10(3) UL (ref 0–0.7)
EOSINOPHIL NFR BLD AUTO: 2 %
ERYTHROCYTE [DISTWIDTH] IN BLOOD BY AUTOMATED COUNT: 14 % (ref 11–15)
FASTING PATIENT LIPID ANSWER: YES
FASTING STATUS PATIENT QL REPORTED: YES
GLOBULIN PLAS-MCNC: 2.9 G/DL (ref 2–3.5)
GLUCOSE BLD-MCNC: 160 MG/DL (ref 70–99)
GLUCOSE UR-MCNC: NORMAL MG/DL
HCT VFR BLD AUTO: 41.5 %
HDLC SERPL-MCNC: 40 MG/DL (ref 40–59)
HGB BLD-MCNC: 13.3 G/DL
HGB UR QL STRIP.AUTO: NEGATIVE
IMM GRANULOCYTES # BLD AUTO: 0.02 X10(3) UL (ref 0–1)
IMM GRANULOCYTES NFR BLD: 0.2 %
KETONES UR-MCNC: NEGATIVE MG/DL
LDLC SERPL CALC-MCNC: 66 MG/DL (ref ?–100)
LEUKOCYTE ESTERASE UR QL STRIP.AUTO: NEGATIVE
LYMPHOCYTES # BLD AUTO: 2.69 X10(3) UL (ref 1–4)
LYMPHOCYTES NFR BLD AUTO: 33.3 %
MCH RBC QN AUTO: 28.5 PG (ref 26–34)
MCHC RBC AUTO-ENTMCNC: 32 G/DL (ref 31–37)
MCV RBC AUTO: 89.1 FL
MONOCYTES # BLD AUTO: 0.43 X10(3) UL (ref 0.1–1)
MONOCYTES NFR BLD AUTO: 5.3 %
NEUTROPHILS # BLD AUTO: 4.74 X10 (3) UL (ref 1.5–7.7)
NEUTROPHILS # BLD AUTO: 4.74 X10(3) UL (ref 1.5–7.7)
NEUTROPHILS NFR BLD AUTO: 58.7 %
NITRITE UR QL STRIP.AUTO: NEGATIVE
NONHDLC SERPL-MCNC: 79 MG/DL (ref ?–130)
PH UR: 5.5 [PH] (ref 5–8)
PLATELET # BLD AUTO: 313 10(3)UL (ref 150–450)
PROT SERPL-MCNC: 7.6 G/DL (ref 5.7–8.2)
PROT UR-MCNC: NEGATIVE MG/DL
RBC # BLD AUTO: 4.66 X10(6)UL
SODIUM SERPL-SCNC: 135 MMOL/L (ref 136–145)
SP GR UR STRIP: 1.01 (ref 1–1.03)
TRIGL SERPL-MCNC: 56 MG/DL (ref 30–149)
UROBILINOGEN UR STRIP-ACNC: NORMAL
VLDLC SERPL CALC-MCNC: 8 MG/DL (ref 0–30)
WBC # BLD AUTO: 8.1 X10(3) UL (ref 4–11)

## 2025-03-10 PROCEDURE — 80061 LIPID PANEL: CPT

## 2025-03-10 PROCEDURE — 81003 URINALYSIS AUTO W/O SCOPE: CPT

## 2025-03-10 PROCEDURE — 80053 COMPREHEN METABOLIC PANEL: CPT

## 2025-03-10 PROCEDURE — 36415 COLL VENOUS BLD VENIPUNCTURE: CPT

## 2025-03-10 PROCEDURE — 85025 COMPLETE CBC W/AUTO DIFF WBC: CPT

## 2025-05-21 ENCOUNTER — HOSPITAL ENCOUNTER (OUTPATIENT)
Dept: NUCLEAR MEDICINE | Facility: HOSPITAL | Age: 80
Discharge: HOME | End: 2025-05-21
Attending: INTERNAL MEDICINE
Payer: MEDICARE

## 2025-05-21 DIAGNOSIS — R91.1 LUNG NODULE: ICD-10-CM

## 2025-05-21 DIAGNOSIS — Z87.891 PERSONAL HISTORY OF TOBACCO USE, PRESENTING HAZARDS TO HEALTH: ICD-10-CM

## 2025-05-21 LAB — GLUCOSE BLDC GLUCOMTR-MCNC: 146 MG/DL (ref 70–99)

## 2025-05-21 PROCEDURE — 78815 PET IMAGE W/CT SKULL-THIGH: CPT | Performed by: INTERNAL MEDICINE

## 2025-05-21 PROCEDURE — 82962 GLUCOSE BLOOD TEST: CPT

## (undated) DEVICE — Device: Brand: CUSTOM PROCEDURE KIT

## (undated) DEVICE — STERILE LATEX POWDER-FREE SURGICAL GLOVESWITH NITRILE COATING: Brand: PROTEXIS

## (undated) DEVICE — 6 ML SYRINGE LUER-LOCK TIP: Brand: MONOJECT

## (undated) DEVICE — Device: Brand: DEFENDO AIR/WATER/SUCTION AND BIOPSY VALVE

## (undated) DEVICE — 35 ML SYRINGE REGULAR TIP: Brand: MONOJECT

## (undated) DEVICE — FORCEP RADIAL JAW 4

## (undated) DEVICE — MEDI-VAC NON-CONDUCTIVE SUCTION TUBING 6MM X 1.8M (6FT.) L: Brand: CARDINAL HEALTH

## (undated) DEVICE — 3 ML SYRINGE LUER-LOCK TIP: Brand: MONOJECT

## (undated) DEVICE — LINE MNTR ADLT SET O2 INTMD

## (undated) NOTE — LETTER
21          Tyshawn Hall  :  12/15/1945      To Whom It May Concern:    My patient, Xochitl Barney may return back to full duty work on 2021. If this office may be of further assistance, please do not hesitate to contact us.       Si

## (undated) NOTE — LETTER
6/21/2020        [address] [place] [address] [place] [address]          Dear Torey Garcia,       Here are your results from your recent visit with Gastroenterology. Your test was negative for active infection by the Covid virus.     If you need

## (undated) NOTE — LETTER
6/22/2020          [address] [place] [address]    Dear Sebas Mathews,     Here are the results from your recent testing :    During your colonoscopy a polyp was removed.   The pathology showed that this polyp was a hyperplastic

## (undated) NOTE — LETTER
[address]   Date:   11/20/2020     Name:   Armando Campo    YOB: 1945   MRN:   [id number]       WHERE IS YOUR PAIN NOW? Hima the areas on your body where you feel the described sensations.   Use the appropriate

## (undated) NOTE — LETTER
[address]   Date:   11/4/2020     Name:   Yony Fox    YOB: 1945   MRN:   [id number]       WHERE IS YOUR PAIN NOW? Hiam the areas on your body where you feel the described sensations.   Use the appropriate s

## (undated) NOTE — LETTER
6/22/2020        Amanda Parikh MD  [address] [address]          Re:  Heber Gosselin   [birth year]   [id number]       Dear Kellie Bhakta,    I had the pleasure of seeing Heber Gosselin for a history of polyps.     She underwent colonoscopy at [facility] A tiny hyperplastic appearing cecal polyp by the appendiceal opening was removed with biopsy forceps. There was extensive sigmoid diverticulosis with tortuosity of the lumen here.   There was no evidence of ulcerations, colitis, vascular anom The specimen is submitted in formalin labeled “Owen, cecum polyp” and consists of one fragment of light pink soft tissue meauring 0.1 cm in greatest dimension. The specimen is inked with eosin and submitted entirely in cassette A1.  (jq)     TARIK Milian

## (undated) NOTE — LETTER
[facility] SURGERY SCHEDULING FORM   [address] [place] [address] [place] [address]   [phone] (scheduling phone) [phone] (scheduling fax)     PATIENT INFORMATION   Last Name:      Mckayla Novoa Name:    Rj Lopez []  No Anesthesia   [x]  Yes  []  No or using our own   Allergies: Penicillins, Radiology Contrast Iodinated Dyes, and Neosporin Af [Miconazole]         Completed by:    Kim Bernstein      Date:    12/1/2020

## (undated) NOTE — Clinical Note
Dear Victoria Fine,    I had the opportunity to see your patient Francy Benavidez recently. I am sending you this update, and I appreciate your confidence in me to care for your patients.  Please feel free call me with any questions at [phone] or contact me thro

## (undated) NOTE — LETTER
[address] [address]   Date:   6/7/2021     Name:   Armando Campo    YOB: 1945   MRN:   [id number]       WHERE IS YOUR PAIN NOW? Hima the areas on your body where you feel the described sensations.

## (undated) NOTE — LETTER
[address] [address]   Date:   12/21/2020     Name:   Fadi Wan    YOB: 1945   MRN:   [id number]       WHERE IS YOUR PAIN NOW? Hima the areas on your body where you feel the described sensations.

## (undated) NOTE — LETTER
6/21/2020        [address] [place] [address] [place] [address]          Dear Marilyn Diego,       Here are your results from your recent visit with Gastroenterology. Your test was negative for active infection by the Covid virus.     If you need

## (undated) NOTE — LETTER
20          Renetta Parker  :  12/15/1945      To Whom It May Concern: This patient was seen in our office on 20 . Work status:  Remain off work, temporary total disability for 2 weeks.     If this office may be of further assistance, pl

## (undated) NOTE — MR AVS SNAPSHOT
[address]  [phone]               Thank you for choosing us for your health care visit with Margarito Macias MD.  We are glad to serve you and happy to provide you with this summar Nekted will allow you to access patient instructions from your recent visit,  view other health information, and more. To sign up or find more information, go to [email or web address] and click on the Sign Up Now link in the [employer] box.      Enter

## (undated) NOTE — LETTER
[address] [address]   Date:   1/11/2021     Name:   Jalil Pacheco    YOB: 1945   MRN:   [id number]       WHERE IS YOUR PAIN NOW? Hima the areas on your body where you feel the described sensations.

## (undated) NOTE — Clinical Note
Dear Med Panda,    I had the opportunity to see your patient Brendon Benitez recently. I am sending you this update, and I appreciate your confidence in me to care for your patients.  Please feel free call me with any questions at [phone] or contact me thro